# Patient Record
Sex: MALE | Race: WHITE
[De-identification: names, ages, dates, MRNs, and addresses within clinical notes are randomized per-mention and may not be internally consistent; named-entity substitution may affect disease eponyms.]

---

## 2017-12-21 ENCOUNTER — HOSPITAL ENCOUNTER (EMERGENCY)
Dept: HOSPITAL 39 - ER | Age: 57
Discharge: ANOTHER HOSPITAL | End: 2017-12-21
Payer: SELF-PAY

## 2017-12-21 VITALS — DIASTOLIC BLOOD PRESSURE: 116 MMHG | SYSTOLIC BLOOD PRESSURE: 196 MMHG | TEMPERATURE: 97.5 F

## 2017-12-21 VITALS — OXYGEN SATURATION: 95 %

## 2017-12-21 DIAGNOSIS — I21.4: Primary | ICD-10-CM

## 2017-12-21 DIAGNOSIS — F17.200: ICD-10-CM

## 2017-12-21 DIAGNOSIS — J81.1: ICD-10-CM

## 2017-12-21 DIAGNOSIS — I16.0: ICD-10-CM

## 2017-12-21 PROCEDURE — 94660 CPAP INITIATION&MGMT: CPT

## 2017-12-21 PROCEDURE — 85730 THROMBOPLASTIN TIME PARTIAL: CPT

## 2017-12-21 PROCEDURE — 80048 BASIC METABOLIC PNL TOTAL CA: CPT

## 2017-12-21 PROCEDURE — 36415 COLL VENOUS BLD VENIPUNCTURE: CPT

## 2017-12-21 PROCEDURE — 82550 ASSAY OF CK (CPK): CPT

## 2017-12-21 PROCEDURE — 84484 ASSAY OF TROPONIN QUANT: CPT

## 2017-12-21 PROCEDURE — 71275 CT ANGIOGRAPHY CHEST: CPT

## 2017-12-21 PROCEDURE — 83880 ASSAY OF NATRIURETIC PEPTIDE: CPT

## 2017-12-21 PROCEDURE — 85610 PROTHROMBIN TIME: CPT

## 2017-12-21 PROCEDURE — 71010: CPT

## 2017-12-21 PROCEDURE — 85025 COMPLETE CBC W/AUTO DIFF WBC: CPT

## 2017-12-21 PROCEDURE — 80076 HEPATIC FUNCTION PANEL: CPT

## 2017-12-21 PROCEDURE — 82553 CREATINE MB FRACTION: CPT

## 2017-12-21 PROCEDURE — 85379 FIBRIN DEGRADATION QUANT: CPT

## 2017-12-21 PROCEDURE — 93005 ELECTROCARDIOGRAM TRACING: CPT

## 2017-12-21 RX ADMIN — NITROGLYCERIN ONE EA: 0.4 TABLET, ORALLY DISINTEGRATING SUBLINGUAL at 18:36

## 2017-12-21 RX ADMIN — NITROGLYCERIN ONE EA: 0.4 TABLET, ORALLY DISINTEGRATING SUBLINGUAL at 18:41

## 2017-12-21 RX ADMIN — METOPROLOL TARTRATE ONE: 5 INJECTION, SOLUTION INTRAVENOUS at 20:24

## 2017-12-21 RX ADMIN — METOPROLOL TARTRATE ONE MG: 5 INJECTION, SOLUTION INTRAVENOUS at 18:37

## 2017-12-21 NOTE — CT
PROCEDURE: CTA Chest



HISTORY: rule out PE



Indication: Same as above



Comparison: None



Technique: 



CT of the chest was done with intravenous contrast followed by CT

angiography of the pulmonary arteries.



Coronal, Sagittal and 3D volumetric MIP reconstructions were

generated from the acquired data.



The patient was injected with contrast intravenously, without any

documented immediate adverse reactions.



This exam was performed according to our departmental

dose-optimization program, which includes automated exposure

control, adjustment of the mA and/or KV according to the

patient's size and/or use of iterative reconstruction technique.



FINDINGS: 



There is no visualization of filling defects in the main

pulmonary trunk, main right and left pulmonary arteries or their

lower order branches to suggest pulmonary embolism.



The bilateral main pulmonary arteries are normal in caliber.



There is no evidence of interventricular septal deviation or

filling defects in the cardiac chambers.



There is presence of minimal pericardial effusion and trace

bilateral pleural effusions. There is presence of few shotty

lymph nodes in the mediastinum and the bilateral hilar regions,

measuring less than 1 cm in short axis dimension. There is a

pathologically enlarged lymph node in the subcarinal region

measuring 13 mm in short axis dimension.



There are no discrete airspace infiltrates or pneumothoraces



The trachea, bilateral mainstem bronchi and the bilateral main 

segmental bronchi are patent without any intraluminal mass

lesions.



There is no gross evidence of clinically significant thoracic

aortic aneurysm or thoracic aortic dissection. 



The thoracic bony rib cage appears grossly unremarkable.



The visualized thoracic spine shows mild multilevel degenerative

change.



Limited evaluation of the evaluated upper abdomen does not show

any gross abnormalities.



IMPRESSION:



 There is no pulmonary embolism

There is presence of minimal pericardial effusion and trace

bilateral pleural effusions. 



There is presence of few shotty lymph nodes in the mediastinum

and the bilateral hilar regions, measuring less than 1 cm in

short axis dimension. There is a pathologically enlarged lymph

node in the subcarinal region measuring 13 mm in short axis

dimension. These findings could be due to infectious,

inflammatory or neoplastic etiology. Clinical correlation to this

finding is requested



Electronically signed by:  Ranjeet Briones MD  12/21/2017 6:45 PM

[facility] Workstation: [id number]

## 2017-12-21 NOTE — RAD
PROCEDURE: XR CHEST 1 VIEW



HISTORY: hypoxia



COMPARISON: 12/21/2017 



TECHNIQUE: 



Single projection of the chest was done.



FINDINGS:



There is presence of mild infiltrate/atelectasis in the medial

right lower lung zone .



There are no pneumothoraces or pleural effusions.



The pulmonary vascularity is normal.



The cardiomediastinal silhouette is stable.



IMPRESSION: 



There is presence of mild infiltrate/atelectasis in the medial

right lower lung zone .



Electronically signed by:  Ranjeet Briones MD  12/21/2017 7:59 PM

[facility] Workstation: [id number]

## 2017-12-21 NOTE — ED.PDOC
History of Present Illness





- General


Chief Complaint: Cardiovascular Problem


Stated Complaint: Elevated BP


Time Seen by Provider: 17 16:45


Source: patient, RN notes reviewed, Vital Signs reviewed


Additional Information: 





Pt sent here from eye doctor for elevated BP. Pt has had some intermittent SOB. 

Questionable occasional chest pain. Pt has not been on antihypertensive therapy 

for 2 years.








- History of Present Illness


Timing/Duration: other - unknown


Activities at Onset: none


Prior Chest Pain/Cardiac Workup: no prior chest pain, no prior cardiac workup


Improving Factors: nothing


Nitro Today/Relief: no nitro taken today


Aspirin Treatment Today: no aspirin today


Associated Symptoms: shortness of breath - occasional


Allergies/Adverse Reactions: 


Allergies





NO KNOWN ALLERGY Allergy (Verified 17 16:28)


 








Home Medications: 


Ambulatory Orders





Bp Med PO DAILY 17 











Review of Systems





- Review of Systems


Constitutional: States: no symptoms reported


EENTM: States: no symptoms reported





Past Medical History (General)





- Patient Medical History


Hx Stroke: No


Hx Congestive Heart Failure: No


Hx Hypertension: Yes


Hx Diabetes: No


Surgical History: no surgical history





- Vaccination History


Hx Influenza Vaccination: Yes - 


Hx Pneumococcal Vaccination:  - unknown





- Social History


Hx Tobacco Use: Yes





Family Medical History





- Family History


  ** Father


Family History: No Known


Living Status: 





Departure





- Departure


Clinical Impression: 


 NSTEMI (non-ST elevated myocardial infarction), LVH (left ventricular 

hypertrophy), Hypertensive urgency





Time of Disposition: 18:45


Disposition: Transfer to Hospital


Condition: Fair


Departure Forms:  ED Discharge - Pt. Copy, Patient Portal Self Enrollment


Instructions:  High Blood Pressure


Referrals: 


Seven Kumar MD [Primary Care Provider] - 1-2 Weeks


Home Medications: 


Ambulatory Orders





Bp Med PO DAILY 17 








Comments: 





Go to [facility] for further assessment and evaluation given abnormal 

cardiac enzymes concerning for heart damage related to excessively uncontrolled 

blood pressure.








Transfer to Outside Facility





- Transfer Information


Accepting Provider:: Dr. Mareclo


Accepting Facility: [facility]


Reason for Transfer: specialized care not available - Cardiology and Inpatient

## 2017-12-21 NOTE — RAD
EXAM DESCRIPTION: 



Chest,1 View



CLINICAL HISTORY: Chest pain, shortness of breath.



COMPARISON: 



None available.



IMPRESSION: 



Single upright portable frontal view of the chest.

Cardiac silhouette shows borderline cardiomegaly without

congestive failure.

Lung volumes are hyperinflated.

Emphysematous changes bilaterally, most noticeable in the upper

lobes.

Lungs are clear without focal consolidative infiltrates.

Bilateral costophrenic angles are sharp.

No pneumothorax. 



Electronically signed by:  Leonel Zamora MD  12/21/2017 5:05 PM [facility]

Workstation: [id number]

## 2020-07-07 ENCOUNTER — HOSPITAL ENCOUNTER (EMERGENCY)
Dept: HOSPITAL 39 - ER | Age: 60
Discharge: ANOTHER HOSPITAL | End: 2020-07-07
Payer: SELF-PAY

## 2020-07-07 VITALS — OXYGEN SATURATION: 97 %

## 2020-07-07 VITALS — DIASTOLIC BLOOD PRESSURE: 110 MMHG | TEMPERATURE: 99.3 F | SYSTOLIC BLOOD PRESSURE: 185 MMHG

## 2020-07-07 DIAGNOSIS — R06.02: ICD-10-CM

## 2020-07-07 DIAGNOSIS — R80.9: ICD-10-CM

## 2020-07-07 DIAGNOSIS — G93.6: ICD-10-CM

## 2020-07-07 DIAGNOSIS — I16.1: Primary | ICD-10-CM

## 2020-07-07 DIAGNOSIS — I12.9: ICD-10-CM

## 2020-07-07 DIAGNOSIS — N18.9: ICD-10-CM

## 2020-07-07 DIAGNOSIS — F17.200: ICD-10-CM

## 2020-07-07 NOTE — ED.PDOC
History of Present Illness





- General


Chief Complaint: Blood Pressure Problem


Stated Complaint: high blood pressure


Time Seen by Provider: 20 17:10


Source: patient


Exam Limitations: no limitations





- History of Present Illness


Initial Comments: 





PT HAS H/O HTN BUT TAKES NO MEDS AND NOT CURRENTLY SEEING A DR.  HE WENT TO DR. BERNAL, NEW PCP, TODAY FOR SOB, DIZZINESS, SLUGGISHNESS.  /155. SENT TO 

ER. 





PT DENIES VISION CHANGES, HA, CP, SOB.  POS SX PER ABOVE.  


Timing/Duration: constant, getting worse


Severity: moderate


Improving Factors: nothing


Worsening Factors: nothing


Associated Symptoms: shortness of breath, weakness


Allergies/Adverse Reactions: 


Allergies





Iodine Allergy (Verified 20 17:19)


   





Home Medications: 


Ambulatory Orders





Bp Med PO DAILY 17 











Review of Systems





- Review of Systems


Constitutional: States: weakness.  Denies: chills, fever


EENTM: Denies: ear pain, throat pain


Respiratory: States: short of breath.  Denies: cough


Cardiology: Denies: chest pain, palpitations


Gastrointestinal/Abdominal: Denies: abdominal pain, nausea


Genitourinary: States: no symptoms reported


Musculoskeletal: States: no symptoms reported


Skin: States: no symptoms reported


Neurological: States: headache, weakness.  Denies: paresthesia, tremors


Endocrine: States: no symptoms reported


Hematologic/Lymphatic: States: no symptoms reported


All other Systems: Reviewed and Negative





Past Medical History (General)





- Patient Medical History


Hx Stroke: No


Hx Congestive Heart Failure: No


Hx Hypertension: Yes


Hx Diabetes: No





- Vaccination History


Hx Influenza Vaccination: Yes - 


Hx Pneumococcal Vaccination:  - unknown





- Social History


Hx Tobacco Use: Yes





Family Medical History





- Family History


  ** Father


Family History: No Known


Living Status: 





Physical Exam





- Physical Exam


General Appearance: Alert, No apparent distress


Eye Exam: bilateral normal


Ears, Nose, Throat: hearing grossly normal, normal ENT inspection, normal 

pharynx


Neck: non-tender, full range of motion, supple, normal inspection


Respiratory: lungs clear, normal breath sounds, no respiratory distress


Cardiovascular/Chest: normal peripheral pulses, regular rate, rhythm, no murmur


Peripheral Pulses: radial,right: 2+, radial,left: 2+


Gastrointestinal/Abdominal: normal bowel sounds, non tender, soft, no 

organomegaly, no pulsatile mass


Rectal Exam: deferred


Back Exam: normal inspection, no CVA tenderness


Extremity: normal range of motion, non-tender, normal inspection, no pedal 

edema, no calf tenderness


Neurologic: CNs II-XII nml as tested, no motor/sensory deficits, alert, normal 

mood/affect, oriented x 3


Skin Exam: normal color, warm/dry


Lymphatic: no adenopathy





Progress





- Progress


Progress: 





20 19:54


HYPERTENSIVE URGENCY - /155, GAVE CLONIDINE 0.3 MG, /124.  GIVING 

LABETALOL FOR TRFR TO [facility]. 


SOB, DIZZINESS, SLUGGISH SX.  NEG P.E. FOR OVERT STROKE/TIA. 





CBC UNREMARKABLE.  CMP - ELEV CR AT 1.81. CRF FROM THE CHRONIC HTN.  


UA - PROTEINURIA, BLOOD, RBC.  


EKG NO ST CHANGES.  





CT HEAD SHOWS VASOGENIC EDEMA OF L CEREBELLUM AND KEVON.  I SPOKE WITH [facility] 

NEURO, DR. GUEVARA.  HE RECOMMENDED TRFR TO ER FOR MRI AND FURTHER HOSPITAL CARE 

FROM THERE.  I SPOKE WITH DR. GIBSON, [facility].  SHE RECOMMENDED LABETALOL FOR 

THE TRANSFER (GIVEN) AND ACCEPTED FURTHER CARE.   


THANK YOU, [facility].  














- EKG/XRAY/CT


EKG: no ST T wave changes





Departure





- Departure


Clinical Impression: 


 Vasogenic brain edema, Hypertensive emergency, Dizziness, Sluggishness





Dyspnea


Qualifiers:


 Dyspnea type: shortness of breath Qualified Code(s): R06.02 - Shortness of 

breath





Chronic renal disease


Qualifiers:


 Chronic kidney disease stage: unspecified stage Qualified Code(s): N18.9 - Chr

onic kidney disease, unspecified





Proteinuria


Qualifiers:


 Proteinuria type: unspecified Qualified Code(s): R80.9 - Proteinuria, 

unspecified





Disposition: Transfer to Hospital


Condition: Fair


Departure Forms:  ED Discharge - Pt. Copy, Patient Portal Self Enrollment


Referrals: 


Loretta Bernal MD [Primary Care Provider] - 1-2 Weeks


Home Medications: 


Ambulatory Orders





Bp Med PO DAILY 17 











Transfer to Outside Facility





- Transfer Information


Decision to Transfer Date: 20


Decision to Transfer Time: 20:06


Reason for Transfer: specialized care not available


Accepting Provider:: DR. GIBSON


Accepting Facility: [facility]

## 2020-07-07 NOTE — CT
PROCEDURE:  Head



CLINICAL HISTORY:  59 years  Male  CONFUSION, HYPERTENSIVE

URGENCY (223/155).



COMPARISON:  None.



TECHNIQUE: Contiguous axial CT images obtained through the brain

without IV contrast.  



This exam was performed according to our department optimization

program which includes automated exposure control, adjustment of

the mA and/or kv according to patient size and/or use of

iterative reconstruction technique.



FINDINGS:



The ventricles and sulci are prominent consistent with atrophic

changes.

Microvascular ischemic changes.

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

Dolichoectasia of the vertebrobasilar system.

There is low-attenuation in the white matter of the left

cerebellum and centrally within the norman. This may be artifactual

but the possibility of underlying abnormality or vasogenic edema

should be considered. Recommend further evaluation with MRI.

No midline shift.

No mass lesions.

No acute hemorrhage. 

Atherosclerotic calcifications.

No fluid or significant mucosal thickening in the visualized

paranasal sinuses.

No depressed calvarial fractures.



IMPRESSION: Abnormal diminished attenuation in the central

cerebellum on the left and in the norman. This may be artifactual

but the possibility of vasogenic edema should be considered.

Recommend further evaluation with MRI

  

 Dr. Pride was called and notified of findings at 5:50 PM

central time.



Generalized atrophy with microvascular ischemic changes.



Electronically signed by:  Velia Reyes MD  7/7/2020 5:51 PM

[facility] Workstation: [id number]

## 2020-08-20 ENCOUNTER — HOSPITAL ENCOUNTER (OUTPATIENT)
Dept: HOSPITAL 39 - LAB.O | Age: 60
Discharge: HOME | End: 2020-08-20
Attending: NURSE PRACTITIONER
Payer: SELF-PAY

## 2020-08-20 DIAGNOSIS — E11.9: Primary | ICD-10-CM

## 2020-08-20 DIAGNOSIS — E03.9: ICD-10-CM

## 2020-08-20 DIAGNOSIS — I10: ICD-10-CM
